# Patient Record
Sex: FEMALE | Race: WHITE | NOT HISPANIC OR LATINO | URBAN - METROPOLITAN AREA
[De-identification: names, ages, dates, MRNs, and addresses within clinical notes are randomized per-mention and may not be internally consistent; named-entity substitution may affect disease eponyms.]

---

## 2017-05-04 ENCOUNTER — IMPORTED ENCOUNTER (OUTPATIENT)
Dept: URBAN - METROPOLITAN AREA CLINIC 1 | Facility: CLINIC | Age: 82
End: 2017-05-04

## 2017-05-04 PROBLEM — H16.143: Noted: 2017-05-04

## 2017-05-04 PROBLEM — H35.042: Noted: 2017-05-04

## 2017-05-04 PROBLEM — H40.1132: Noted: 2017-05-04

## 2017-05-04 PROBLEM — H35.372: Noted: 2017-05-04

## 2017-05-04 PROBLEM — H35.033: Noted: 2017-05-04

## 2017-05-04 PROBLEM — H04.123: Noted: 2017-05-04

## 2017-05-04 PROBLEM — Z96.1: Noted: 2017-05-04

## 2017-05-04 PROBLEM — H26.493: Noted: 2017-05-04

## 2017-05-04 PROCEDURE — 92012 INTRM OPH EXAM EST PATIENT: CPT

## 2017-05-04 NOTE — PATIENT DISCUSSION
1.  Moderate Open Angle Glaucoma OU (0.75/0.6)- Stable IOP and C/D OU. Patient to continue with Latanoprost OU QHS. Patient advised to be compliant with gtts. Condition was discussed with patient and patient understands. Will continue to monitor patient for any progression in condition. Patient was advised to call us with any problems questions or concerns. 2.  PCO OU- Visually Significant and yag cap recommended. Pt's daughter wishes to talk to family and get back to us. Risks and benefits discussed with pt and pt desires to schedule yag cap. 3. BLU w/ PEK OU- Stable. The continuation of artificial tears were recommended. 4.  Epiretinal Membrane OS- Stable. Observe for change. 5. 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 Pseudophakia OU- Doing well. 7.  Return for an appointment for a 30/OCt in 4 months with Dr. Lucille Butler.

## 2017-09-21 ENCOUNTER — IMPORTED ENCOUNTER (OUTPATIENT)
Dept: URBAN - METROPOLITAN AREA CLINIC 1 | Facility: CLINIC | Age: 82
End: 2017-09-21

## 2017-09-21 PROBLEM — Z96.1: Noted: 2017-09-21

## 2017-09-21 PROBLEM — H35.042: Noted: 2017-09-21

## 2017-09-21 PROBLEM — H35.033: Noted: 2017-09-21

## 2017-09-21 PROBLEM — H04.123: Noted: 2017-09-21

## 2017-09-21 PROBLEM — H35.372: Noted: 2017-09-21

## 2017-09-21 PROBLEM — H26.493: Noted: 2017-09-21

## 2017-09-21 PROBLEM — H40.1132: Noted: 2017-09-21

## 2017-09-21 PROBLEM — H16.143: Noted: 2017-09-21

## 2017-09-21 PROBLEM — H35.362: Noted: 2017-09-21

## 2017-09-21 PROCEDURE — 92133 CPTRZD OPH DX IMG PST SGM ON: CPT

## 2017-09-21 PROCEDURE — 92015 DETERMINE REFRACTIVE STATE: CPT

## 2017-09-21 PROCEDURE — 92014 COMPRE OPH EXAM EST PT 1/>: CPT

## 2017-09-21 NOTE — PATIENT DISCUSSION
1.  Moderate Open Angle Glaucoma OU (0.8/0.65)- Stable IOP OU. Increase C/D OU. RNFL by OCT OU essentially normal OU but RNFL may be affected by retinal changes. Patient to continue with Latanoprost OU QHS. Patient advised to be compliant with gtts. Condition was discussed with patient and patient understands. Will continue to monitor patient for any progression in condition. Patient was advised to call us with any problems questions or concerns. 2.  Epiretinal Membrane OS - Observe for change. 3. PCO OU-Visually Significant but will defer Yag per pt at this time. 4.  BLU w/ PEK OU- Stable. The continuation of artificial tears were recommended. 5.  Macular Drusen OS (new)- Observe. 6. GR I Hypertensive Retinopathy OU w/ secondary MA OS- Stable continue HTN Control7. Pseudophakia OU- Doing well. 8.  Return for an appointment for a dfe/glare in 6 months with Dr. Valorie Robles.

## 2018-03-22 ENCOUNTER — IMPORTED ENCOUNTER (OUTPATIENT)
Dept: URBAN - METROPOLITAN AREA CLINIC 1 | Facility: CLINIC | Age: 83
End: 2018-03-22

## 2018-03-22 PROBLEM — H40.1132: Noted: 2018-03-22

## 2018-03-22 PROBLEM — H35.372: Noted: 2018-03-22

## 2018-03-22 PROCEDURE — 92012 INTRM OPH EXAM EST PATIENT: CPT

## 2018-03-22 NOTE — PATIENT DISCUSSION
1.  Moderate Open Angle Glaucoma OU (0.8/0.65)- Stable IOP OU on current gtt. Patient to continue with Latanoprost OU QHS. Patient advised to be compliant with gtts. 2.  Epiretinal Membrane OS - Stable Observe for change. 3. PCO OU- Pt defers YAG at this time. 4.  BLU w/ PEK OU- Cont ATs TID OU Routinely. 5.  Macular Drusen OS- Observe. 6. GR I Hypertensive Retinopathy OU - Stable continue HTN Control7. Pseudophakia OU8. Documented Dementia- All conditions discussed with patient and Son today. Written orders for Facility given to patient. Return for an appointment in 6 mo 30 glare (no testing per PMG) with Dr. Tia Redding.

## 2022-03-11 ASSESSMENT — TONOMETRY
OD_IOP_MMHG: 12
OS_IOP_MMHG: 10
OD_IOP_MMHG: 10
OD_IOP_MMHG: 13
OS_IOP_MMHG: 13
OS_IOP_MMHG: 12
OD_IOP_MMHG: 12
OS_IOP_MMHG: 11

## 2022-03-11 ASSESSMENT — VISUAL ACUITY
OD_SC: 20/70-1
OS_SC: 20/30+1
OS_GLARE: 20/70
OS_CC: J3
OD_GLARE: 20/70
OS_SC: 20/25
OD_GLARE: 20/70
OD_SC: 20/50
OS_GLARE: 20/70
OS_SC: 20/30
OD_SC: 20/50
OD_SC: 20/50+2
OS_SC: 20/20
OD_CC: J3